# Patient Record
Sex: MALE | URBAN - METROPOLITAN AREA
[De-identification: names, ages, dates, MRNs, and addresses within clinical notes are randomized per-mention and may not be internally consistent; named-entity substitution may affect disease eponyms.]

---

## 2022-04-27 ENCOUNTER — APPOINTMENT (RX ONLY)
Dept: URBAN - METROPOLITAN AREA OTHER 8 | Facility: OTHER | Age: 52
Setting detail: DERMATOLOGY
End: 2022-04-27

## 2022-04-27 DIAGNOSIS — D36.1 BENIGN NEOPLASM OF PERIPHERAL NERVES AND AUTONOMIC NERVOUS SYSTEM: ICD-10-CM

## 2022-04-27 DIAGNOSIS — I83.9 ASYMPTOMATIC VARICOSE VEINS OF LOWER EXTREMITIES: ICD-10-CM

## 2022-04-27 PROBLEM — I83.91 ASYMPTOMATIC VARICOSE VEINS OF RIGHT LOWER EXTREMITY: Status: ACTIVE | Noted: 2022-04-27

## 2022-04-27 PROBLEM — D36.12 BENIGN NEOPLASM OF PERIPHERAL NERVES AND AUTONOMIC NERVOUS SYSTEM, UPPER LIMB, INCLUDING SHOULDER: Status: ACTIVE | Noted: 2022-04-27

## 2022-04-27 PROCEDURE — ? COUNSELING

## 2022-04-27 PROCEDURE — ? ADDITIONAL NOTES

## 2022-04-27 PROCEDURE — 99202 OFFICE O/P NEW SF 15 MIN: CPT

## 2022-04-27 ASSESSMENT — LOCATION DETAILED DESCRIPTION DERM
LOCATION DETAILED: LEFT VENTRAL PROXIMAL FOREARM
LOCATION DETAILED: RIGHT DISTAL CALF

## 2022-04-27 ASSESSMENT — LOCATION ZONE DERM
LOCATION ZONE: ARM
LOCATION ZONE: LEG

## 2022-04-27 ASSESSMENT — LOCATION SIMPLE DESCRIPTION DERM
LOCATION SIMPLE: LEFT FOREARM
LOCATION SIMPLE: RIGHT CALF

## 2022-04-27 NOTE — PROCEDURE: ADDITIONAL NOTES
Additional Notes: Recommended follow up with - card info given
Detail Level: Simple
Render Risk Assessment In Note?: no

## 2022-04-27 NOTE — HPI: SKIN LESIONS
How Severe Is Your Skin Lesion?: mild
Have Your Skin Lesions Been Treated?: not been treated
Is This A New Presentation, Or A Follow-Up?: Skin Lesion
Additional History: Pt said when the pain started the area was blue gilmar and it faded away after a couple of days. Today the area is clear but is painful.

## 2022-05-02 ENCOUNTER — APPOINTMENT (RX ONLY)
Dept: URBAN - METROPOLITAN AREA OTHER 8 | Facility: OTHER | Age: 52
Setting detail: DERMATOLOGY
End: 2022-05-02

## 2022-05-02 DIAGNOSIS — M79.6 PAIN IN LIMB, HAND, FOOT, FINGERS AND TOES: ICD-10-CM

## 2022-05-02 PROBLEM — M79.661 PAIN IN RIGHT LOWER LEG: Status: ACTIVE | Noted: 2022-05-02

## 2022-05-02 PROCEDURE — 93971 EXTREMITY STUDY: CPT

## 2022-05-02 PROCEDURE — ? COUNSELING

## 2022-05-02 PROCEDURE — 99212 OFFICE O/P EST SF 10 MIN: CPT

## 2022-05-02 PROCEDURE — ? DOPPLER US

## 2022-05-02 ASSESSMENT — LOCATION SIMPLE DESCRIPTION DERM: LOCATION SIMPLE: RIGHT CALF

## 2022-05-02 ASSESSMENT — LOCATION DETAILED DESCRIPTION DERM
LOCATION DETAILED: RIGHT DISTAL CALF
LOCATION DETAILED: RIGHT PROXIMAL CALF

## 2022-05-02 ASSESSMENT — LOCATION ZONE DERM: LOCATION ZONE: LEG

## 2022-05-02 NOTE — HPI: OTHER
Condition:: Pain in right calf, starting approximately one week ago, described as a stabbing continues to worsen as the day continues, pt on his feet all day with his job.  Half through the day it causes him to limp. No prior vein procedures, here for evaluation referred by Dr Streeter.
Please Describe Your Condition:: is an established patient who is being seen for a chief complaint of Pain in right calf, starting approximately one week ago, described as a stabbing continues to worsen as the day continues, pt on his feet all day with his job.  Half through the day it causes him to limp. Several of his co-workers told him the back of the calf \"looked bruised.\"  He is concerned about a blood clot and comes in today for evaluation.  He has not noticed any abnormal swelling in his ankle or foot.  He states the back of his calf \"feels swollen\"  at the end of the day.  \\n\\n\\n No prior vein procedures, here for evaluation referred by Dr Streeter.. Pain in right calf

## 2022-05-02 NOTE — PROCEDURE: DOPPLER US
Detail Level: Simple
Left Ssv/Lsv Reflux (Sec): no reflux
Use Ssv Or Lsv: SSV
Other Right Leg Doppler Findings: No DVT or superficial thrombus.  I was able to visualize the popliteal vein, and deep calf veins.  No evidence of subQ fluid in the calf.
Ultrasound Used (Optional): Terason 3000